# Patient Record
Sex: FEMALE | Race: WHITE | NOT HISPANIC OR LATINO | URBAN - METROPOLITAN AREA
[De-identification: names, ages, dates, MRNs, and addresses within clinical notes are randomized per-mention and may not be internally consistent; named-entity substitution may affect disease eponyms.]

---

## 2017-03-22 ENCOUNTER — GENERIC CONVERSION - ENCOUNTER (OUTPATIENT)
Dept: OTHER | Facility: OTHER | Age: 62
End: 2017-03-22

## 2017-04-03 ENCOUNTER — ALLSCRIPTS OFFICE VISIT (OUTPATIENT)
Dept: OTHER | Facility: OTHER | Age: 62
End: 2017-04-03

## 2017-04-03 DIAGNOSIS — E03.9 HYPOTHYROIDISM: ICD-10-CM

## 2017-04-03 DIAGNOSIS — E11.9 TYPE 2 DIABETES MELLITUS WITHOUT COMPLICATIONS (HCC): ICD-10-CM

## 2017-04-03 DIAGNOSIS — F31.9 BIPOLAR DISORDER (HCC): ICD-10-CM

## 2017-05-02 ENCOUNTER — ALLSCRIPTS OFFICE VISIT (OUTPATIENT)
Dept: OTHER | Facility: OTHER | Age: 62
End: 2017-05-02

## 2017-06-01 ENCOUNTER — ALLSCRIPTS OFFICE VISIT (OUTPATIENT)
Dept: OTHER | Facility: OTHER | Age: 62
End: 2017-06-01
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
## 2018-01-10 NOTE — MISCELLANEOUS
July 11, 2016    To Whom It May Concern,    Ivanna Huerta has a diagnosis of nonepileptic seizure disorder  She has pseudoseizures  Please contact our office if you have any questions or concerns  Sincerely         TAMEKA Miguel        Electronically signed by:Barry Cardenas MD  Jul 11 2016  3:05PM EST

## 2018-01-12 VITALS
RESPIRATION RATE: 15 BRPM | WEIGHT: 167.38 LBS | BODY MASS INDEX: 28.57 KG/M2 | HEIGHT: 64 IN | SYSTOLIC BLOOD PRESSURE: 118 MMHG | HEART RATE: 86 BPM | OXYGEN SATURATION: 93 % | DIASTOLIC BLOOD PRESSURE: 66 MMHG

## 2018-01-13 VITALS
DIASTOLIC BLOOD PRESSURE: 64 MMHG | SYSTOLIC BLOOD PRESSURE: 116 MMHG | OXYGEN SATURATION: 97 % | WEIGHT: 165 LBS | HEART RATE: 79 BPM | RESPIRATION RATE: 15 BRPM | BODY MASS INDEX: 28.17 KG/M2 | HEIGHT: 64 IN

## 2018-01-17 NOTE — MISCELLANEOUS
Assessment    1  Pulmonary nodule seen on imaging study (793 11) (R91 1)   2  Abnormal loss of weight (783 21) (R63 4)   3  Diabetes mellitus type II, controlled (250 00) (E11 9)   4  Skin rash (782 1) (R21)    Plan  Abnormal loss of weight, Pulmonary nodule seen on imaging study    · * CT CHEST ABDOMEN PELVIS W CONTRAST; Status:Need Information - Financial  Authorization; Requested for:03Apr2017;    Perform:[facility]; EZZ:[id number]; Ordered; For:Abnormal loss of weight, Pulmonary nodule seen on imaging study; Ordered By:Eddy Mcclellan; Chronic low back pain    · Oxycodone-Acetaminophen 5-325 MG Oral Tablet; TAKE 1-2 TABLETs EVERY 4  TO 6 HOURS AS NEEDED FOR PAIN   Rx By: Deb Israel; Dispense: 2 Days ; #:120 Tablet; Refill: 0; For: Chronic low back pain; ARNAV = N; Print Rx  Diabetes mellitus type II, controlled    · (1) COMPREHENSIVE METABOLIC PANEL; Status:Active; Requested for:03Apr2017;    Perform:[facility]; Due:03Apr2018; Ordered; For:Diabetes mellitus type II, controlled; Ordered By:Barry Mcclellan;   · (1) HEMOGLOBIN A1C; Status:Active; Requested for:03Apr2017;    Perform:[facility]; Due:03Apr2018; Ordered; For:Diabetes mellitus type II, controlled; Ordered By:Eddy Mcclellan;   · (1) LIPID PANEL, FASTING; Status:Active; Requested for:03Apr2017;    Perform:[facility]; Due:03Apr2018; Ordered; For:Diabetes mellitus type II, controlled; Ordered By:Barry Mcclellan;   · (1) MICROALBUMIN CREATININE RATIO, RANDOM URINE; Status:Active; Requested  for:03Apr2017;    Perform:[facility]; Due:03Apr2018; Ordered; For:Diabetes mellitus type II, controlled; Ordered By:Eddy Mcclellan;   · *VB - Foot Exam; Status:Complete;   Done: 63KCE4142 11:39AM   Performed: In Office; JAY:[id number];SMMYNVF; For:Diabetes mellitus type II, controlled; Ordered By:Eddy Mcclellan;   Health Maintenance    · Divalproex Sodium 500 MG Oral Tablet Delayed Release; TAKE 1 TABLET DAILY AS  DIRECTED   Rx By: Laura Fisher; Dispense: 30 Days ; #:30 Tablet Delayed Release; Refill: 2; For: Health Maintenance; ARNAV = N; Verified Transmission to [facility]; Last Updated By: System, SureScripts; 4/3/2017 11:05:12 AM   · Furosemide 20 MG Oral Tablet; TAKE 2 TABLETS IN THE AM AND 1 TABLET IN THE  AFTERNOON DAILY   Rx By: Laura Fisher; Dispense: 60 Days ; #:90 Tablet; Refill: 3; For: Health Maintenance; ARNAV = N; Verified Transmission to [facility]; Last Updated By: System, SureScripts; 4/3/2017 11:05:11 AM   · Pantoprazole Sodium 40 MG Oral Tablet Delayed Release; Take 1 tablet daily   Rx By: Laura Fisher; Dispense: 30 Days ; #:30 Tablet Delayed Release; Refill: 5; For: Health Maintenance; ARNAV = N; Verified Transmission to [facility]; Last Updated By: System, SureScripts; 4/3/2017 11:05:11 AM   · QUEtiapine Fumarate 200 MG Oral Tablet; TAKE 1 TABLET AT BEDTIME   Rx By: Laura Fisher; Dispense: 90 Days ; #:90 Tablet; Refill: 0; For: Health Maintenance; ARNAV = N; Verified Transmission to [address]; Last Updated By: System, SureScripts; 4/3/2017 11:05:10 AM  Screening for colorectal cancer    · COLONOSCOPY; Status:Active; Requested for:03Apr2017;    Perform:[facility]; XPU:[id number]; Ordered; For:Screening for colorectal cancer; Ordered By:Coy Mcclellan;  Skin rash    · Triamcinolone Acetonide 0 1 % External Cream; APPLY  AND RUB  IN A THIN FILM  TO AFFECTED AREAS TWICE DAILY  (AM AND PM)   Rx By: Laura Fisher; Dispense: 0 Days ; #:1 X 80 GM Tube; Refill: 0; For: Skin rash; ARNAV = N; Verified Transmission to [address]; Last Updated By: System, SureScripts; 4/3/2017 10:42:30 AM    Chief Complaint  Chief Complaint Free Text Note Form: KIRTI      History of Present Illness  TCM Communication [facility]: The patient is being contacted for follow-up after hospitalization  She was hospitalized at [address] [place] [address]   The date of admission: 3/3/2017, date of discharge: 3/22/2017  She was discharged to home  Medications reviewed and updated today  Communication performed and completed by       HPI: She was admitted to the psychiatric unit for suicidal ideation  Her medications were adjusted  "I feel right-stable " She denies current HI/SI  She has no side effects from her current regimen  She has pain in her back and hips  She falls frequently  She has pseudoseizures  She takes Percocet for pain  She has lost considerable weight  She is down 14-15 pounds since the winter, but >30 pounds since last year  She states states that the weight loss is unintentional, but her diet is better and she is walking more  Her BP is at goal on her current regimen  She has no CP or SOB  She has no HA or vision changes  She is due for lipid panel  Review of Systems  Complete-Female:   Constitutional: No fever, no chills, feels well, no tiredness, no recent weight gain or weight loss  Eyes: No complaints of eye pain, no red eyes, no eyesight problems, no discharge, no dry eyes, no itching of eyes  ENT: no complaints of earache, no loss of hearing, no nose bleeds, no nasal discharge, no sore throat, no hoarseness  Cardiovascular: No complaints of slow heart rate, no fast heart rate, no chest pain, no palpitations, no leg claudication, no lower extremity edema  Respiratory: No complaints of shortness of breath, no wheezing, no cough, no SOB on exertion, no orthopnea, no PND  Gastrointestinal: No complaints of abdominal pain, no constipation, no nausea or vomiting, no diarrhea, no bloody stools  Genitourinary: No complaints of dysuria, no incontinence, no pelvic pain, no dysmenorrhea, no vaginal discharge or bleeding  Musculoskeletal: No complaints of arthralgias, no myalgias, no joint swelling or stiffness, no limb pain or swelling     Integumentary: No complaints of skin rash or lesions, no itching, no skin wounds, no breast pain or lump  Neurological: No complaints of headache, no confusion, no convulsions, no numbness, no dizziness or fainting, no tingling, no limb weakness, no difficulty walking  Psychiatric: Not suicidal, no sleep disturbance, no anxiety or depression, no change in personality, no emotional problems  Endocrine: No complaints of proptosis, no hot flashes, no muscle weakness, no deepening of the voice, no feelings of weakness  Hematologic/Lymphatic: No complaints of swollen glands, no swollen glands in the neck, does not bleed easily, does not bruise easily  Active Problems    1  Abnormal finding on chest xray (793 2) (R93 8)   2  Abnormal head CT (793 0) (R93 0)   3  Abnormal loss of weight (783 21) (R63 4)   4  Anxiety (300 00) (F41 9)   5  Arthralgia of both knees (719 46) (M25 561,M25 562)   6  Atypical chest pain (786 59) (R07 89)   7  Bipolar 1 disorder (296 7) (F31 9)   8  Chronic low back pain (724 2,338 29) (M54 5,G89 29)   9  Closed head injury, initial encounter (959 01) (S09 90XA)   10  Depression (311) (F32 9)   11  Diabetes mellitus type II, controlled (250 00) (E11 9)   12  Encounter for screening mammogram for malignant neoplasm of breast (V76 12)    (Z12 31)   13  Frequent falls (V15 88) (R29 6)   14  GERD without esophagitis (530 81) (K21 9)   15  Headache (784 0) (R51)   16  Need for influenza vaccination (V04 81) (Z23)   17  Osteoarthritis of both knees (715 96) (M17 0)   18  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)   19  Port catheter in place (V45 89) (A82 990)   20  Pulmonary nodule seen on imaging study (793 11) (R91 1)   21  Pyelonephritis (590 80) (N12)   22  Rectal hemorrhage (569 3) (K62 5)   23  Screening for colorectal cancer (V76 51) (Z12 11,Z12 12)   24  Slow transit constipation (564 01) (K59 01)   25  Urinary incontinence (788 30) (R32)   26  Visit for screening mammogram (V76 12) (Z12 31)   27  Vitamin D deficiency (268 9) (E55 9)    Past Medical History    1   History of Cellulitis of hand (682 4) (L03 119)   2  History of seizure disorder (V12 49) (Q06 39)    Surgical History    1  History of Ankle Surgery   2  History of Back Surgery   3  History of Cholecystectomy   4  History of Gastric Surgery For Morbid Obesity Gastric Bypass   5  History of Hysterectomy   6  History of Knee Replacement    Family History  Mother    1  Family history of malignant neoplasm (V16 9) (Z80 9)   2  Family history unobtainable (V49 89) (Z78 9)  Father    3  Family history of malignant neoplasm (V16 9) (Z80 9)   4  Family history unobtainable (V49 89) (Z78 9)  Grandparent    5  Family history unobtainable (V49 89) (Z78 9)    Social History    · Never a smoker    Current Meds   1  Accu-Chek Softclix Lancets Miscellaneous; TEST BLOOD SUGARS 4 TIMES DAILY; Therapy: 76JWP8302 to (Last Rx:14Zlc8671)  Requested for: 13RCO2994 Ordered   2  Alcohol Pads 70 % Pad; USE AS DIRECTED; Therapy: 20Jun2016 to (Last Rx:20Jun2016)  Requested for: 20Jun2016 Ordered   3  Atorvastatin Calcium 20 MG Oral Tablet; Take 1 tablet by mouth at bedtime; Therapy: 57KHV6374 to (Evaluate:28Mny8681)  Requested for: 26Jan2017; Last   Rx:26Jan2017 Ordered   4  BD Insulin Syr Ultrafine II 31G X 5/16" 1 ML Miscellaneous; USE AS DIRECTED; Therapy: 31JDQ0977 to (Last :96CHN5226)  Requested for: 54PMO7490 Ordered   5  Depend Undergarments Miscellaneous; as directed; Therapy: 68IWZ0480 to (Last FD:53ABT7732)  Requested for: 39EKY7234 Ordered   6  Escitalopram Oxalate 20 MG Oral Tablet; TAKE 1 TABLET DAILY; Therapy: 63OHH8117 to (Evaluate:06Jun2017)  Requested for: 18YFR3773; Last   Rx:68Upn3403 Ordered   7  LamoTRIgine 150 MG Oral Tablet; TAKE 1 TABLET TWICE DAILY; Therapy: 21YIO0862 to (Evaluate:67Sta7360)  Requested for: 65Fbz1711; Last   Rx:78Syi0924 Ordered   8  Levothyroxine Sodium 100 MCG Oral Tablet; TAKE 1 TABLET DAILY;    Therapy: 40OQK5623 to (Evaluate:15Lfu5703)  Requested for: 02Dec2016; Last   Rx:02Dec2016 Ordered   9  MetFORMIN HCl - 1000 MG Oral Tablet; TAKE 1 TABLET EVERY 12 HOURS DAILY; Therapy: 93EFN8955 to (Last Rx:46Vex0069)  Requested for: 54Xfe7833 Ordered   10  OneTouch Ultra Blue In Vitro Strip; USE 4 STRIP 4 times daily TEST 4X DAILY; Therapy: 31IRR9095 to (Last Rx:11Oct2016)  Requested for: 73XCE4156 Ordered   11  OneTouch UltraSoft Lancets Miscellaneous; Test 3 times daily; Therapy: 62JJY2424 to (Last IN:64VVH0934)  Requested for: 02JQY1229 Ordered   12  Oxycodone-Acetaminophen 5-325 MG Oral Tablet; TAKE 1-2 TABLETs EVERY 4 TO 6    HOURS AS NEEDED FOR PAIN;    Therapy: 79HMO1477 to (Evaluate:85Fbj6867); Last Rx:51Tul6491 Ordered   13  Prazosin HCl - 1 MG Oral Capsule; TAKE 1 CAPSULE BY MOUTH DAILY AT BEDTIME    FOR NIGHTMARES; Therapy: 48EKE6679 to (Evaluate:10Pfu8228)  Requested for: 45Oqb0468; Last    Rx:44Jlv9645 Ordered   14  Vitamin D (Ergocalciferol) 88835 UNIT Oral Capsule; TAKE 1 CAPSULE WEEKLY; Therapy: 83IST8631 to (Last QF:62XQC2150)  Requested for: 30SPM6164 Ordered    Allergies    1  Aspirin TABS   2  Darvocet-N 50 TABS   3  Morphine Derivatives   4  Sulfa Drugs    Physical Exam    Constitutional   General appearance: No acute distress, well appearing and well nourished  Pulmonary   Respiratory effort: No increased work of breathing or signs of respiratory distress  Auscultation of lungs: Clear to auscultation  Cardiovascular   Auscultation of heart: Normal rate and rhythm, normal S1 and S2, without murmurs  Examination of extremities for edema and/or varicosities: Normal     Abdomen   Abdomen: Non-tender, no masses  Liver and spleen: No hepatomegaly or splenomegaly  Musculoskeletal   Gait and station: Normal     Digits and nails: Normal without clubbing or cyanosis  Inspection/palpation of joints, bones, and muscles: Normal         Socks and shoes removed, Right Foot Findings: normal foot, no swelling, no erythema   The right toes were normal    The sensory exam showed normal vibratory sensation at the level of the toes on the right  Normal tactile sensation with monofilament testing throughout the right foot  Socks and shoes removed, Left Foot Findings: normal foot, no swelling, no erythema  The left toes were normal    The sensory exam showed normal vibratory sensation at the level of the toes on the left  Normal tactile sensation with monofilament testing throughout the left foot  Capillary refills findings on the right were normal in the toes  Pulses:   1+ in the posterior tibialis on the right   1+ in the dorsalis pedis on the right  Capillary refills findings on the left were normal in the toes  Pulses:   1+ in the posterior tibialis on the left   1+ in the dorsalis pedis on the left  Assign Risk Category: 0: No loss of protective sensation, no deformity  No present risk        Results/Data  [facility] 99NEK5011 12:00AM      Test Name Result Flag Reference   Retinopathy Screening      No Retinopathy on exam       Signatures   Electronically signed by : Rafa Farrell MD; Apr  3 2017  4:29PM EST                       (Author)